# Patient Record
Sex: MALE | Race: WHITE | NOT HISPANIC OR LATINO | URBAN - NONMETROPOLITAN AREA
[De-identification: names, ages, dates, MRNs, and addresses within clinical notes are randomized per-mention and may not be internally consistent; named-entity substitution may affect disease eponyms.]

---

## 2022-09-13 ENCOUNTER — APPOINTMENT (OUTPATIENT)
Dept: URBAN - NONMETROPOLITAN AREA SURGERY 8 | Age: 42
Setting detail: DERMATOLOGY
End: 2022-09-13

## 2022-09-13 DIAGNOSIS — B35.4 TINEA CORPORIS: ICD-10-CM

## 2022-09-13 DIAGNOSIS — L40.0 PSORIASIS VULGARIS: ICD-10-CM

## 2022-09-13 PROCEDURE — OTHER ADDITIONAL NOTES: OTHER

## 2022-09-13 PROCEDURE — OTHER MIPS QUALITY: OTHER

## 2022-09-13 PROCEDURE — OTHER COUNSELING: OTHER

## 2022-09-13 PROCEDURE — OTHER PRESCRIPTION: OTHER

## 2022-09-13 PROCEDURE — 99204 OFFICE O/P NEW MOD 45 MIN: CPT

## 2022-09-13 PROCEDURE — OTHER KOH PREP: OTHER

## 2022-09-13 RX ORDER — TRIAMCINOLONE ACETONIDE 1 MG/G
OINTMENT TOPICAL
Qty: 454 | Refills: 2 | Status: ERX | COMMUNITY
Start: 2022-09-13

## 2022-09-13 RX ORDER — TERBINAFINE HYDROCHLORIDE 250 MG/1
TABLET ORAL
Qty: 14 | Refills: 0 | Status: ERX | COMMUNITY
Start: 2022-09-13

## 2022-09-13 ASSESSMENT — LOCATION SIMPLE DESCRIPTION DERM
LOCATION SIMPLE: RIGHT PRETIBIAL REGION
LOCATION SIMPLE: ABDOMEN
LOCATION SIMPLE: LEFT PRETIBIAL REGION
LOCATION SIMPLE: LEFT BUTTOCK
LOCATION SIMPLE: CHEST
LOCATION SIMPLE: RIGHT BUTTOCK
LOCATION SIMPLE: LEFT LOWER BACK

## 2022-09-13 ASSESSMENT — LOCATION ZONE DERM
LOCATION ZONE: TRUNK
LOCATION ZONE: LEG

## 2022-09-13 ASSESSMENT — LOCATION DETAILED DESCRIPTION DERM
LOCATION DETAILED: LEFT INFERIOR MEDIAL MIDBACK
LOCATION DETAILED: RIGHT PROXIMAL PRETIBIAL REGION
LOCATION DETAILED: LEFT BUTTOCK
LOCATION DETAILED: LEFT LATERAL ABDOMEN
LOCATION DETAILED: LEFT PROXIMAL PRETIBIAL REGION
LOCATION DETAILED: LEFT MEDIAL INFERIOR CHEST
LOCATION DETAILED: RIGHT BUTTOCK

## 2022-09-13 NOTE — PROCEDURE: ADDITIONAL NOTES
Render Risk Assessment In Note?: no
Additional Notes: History of biopsy proven psoriasis managed with topical steroids until he began to flare. He was started on Humira, took two doses, and then was diagnosed with  NonHodgkin Lymphoma. He has been treated with both chemotherapy and radiation. He was in remission, however he has a follow up appt with a new oncologist on 9/20/2022 to be sure that that remains the case. We have no outside records from oncology. In the meantime, his psoriasis is uncontrolled. BSA 30%. He is treatment naive at this point Since he has been without therapy for extended time now. Start triamcinolone bid x 4 weeks after completing treatment for tinea corporis. Follow up 6 weeks. If no improvement consider nbUVB vs biologics providing he remains in remission.
Detail Level: Simple

## 2022-09-13 NOTE — HPI: RASH (PSORIASIS)
Is This A New Presentation, Or A Follow-Up?: Psoriasis
Additional History: Pt reports being prescribed Humira by [facility]. He states the Humira caused his nonhodgkin’s lymphoma and has not had any treatments since March/April.

## 2022-09-13 NOTE — PROCEDURE: MIPS QUALITY
Quality 431: Preventive Care And Screening: Unhealthy Alcohol Use - Screening: Patient not identified as an unhealthy alcohol user when screened for unhealthy alcohol use using a systematic screening method
Quality 110: Preventive Care And Screening: Influenza Immunization: Influenza Immunization not Administered because Patient Refused.
Quality 226: Preventive Care And Screening: Tobacco Use: Screening And Cessation Intervention: Patient screened for tobacco use, is a smoker AND received Cessation Counseling within the Previous 12 Months
Quality 130: Documentation Of Current Medications In The Medical Record: Current Medications Documented
Quality 402: Tobacco Use And Help With Quitting Among Adolescents: Patient screened for tobacco and is a smoker AND received Cessation Counseling
Detail Level: Generalized

## 2022-09-13 NOTE — PROCEDURE: COUNSELING
Patient Specific Counseling (Will Not Stick From Patient To Patient): Pt has follow up with [facility] oncology dept on 9/20/2022.\\nDiscussed starting NUVB at follow up.\\nPt instructed to start triamcinolone ointment after completing 2 weeks of terbinafine treatment. Patient Specific Counseling (Will Not Stick From Patient To Patient): Pt has follow up with [facility] oncology dept on 9/20/2022.\\nDiscussed starting NUVB at follow up.\\nPt instructed to start triamcinolone ointment after completing 2 weeks of terbinafine treatment.

## 2023-01-10 ENCOUNTER — APPOINTMENT (OUTPATIENT)
Dept: URBAN - NONMETROPOLITAN AREA SURGERY 8 | Age: 43
Setting detail: DERMATOLOGY
End: 2023-01-10

## 2023-01-10 DIAGNOSIS — L40.0 PSORIASIS VULGARIS: ICD-10-CM

## 2023-01-10 DIAGNOSIS — B35.4 TINEA CORPORIS: ICD-10-CM

## 2023-01-10 DIAGNOSIS — L82.1 OTHER SEBORRHEIC KERATOSIS: ICD-10-CM

## 2023-01-10 PROCEDURE — OTHER ADDITIONAL NOTES: OTHER

## 2023-01-10 PROCEDURE — OTHER REASSURANCE: OTHER

## 2023-01-10 PROCEDURE — 99213 OFFICE O/P EST LOW 20 MIN: CPT

## 2023-01-10 PROCEDURE — OTHER COUNSELING: OTHER

## 2023-01-10 PROCEDURE — OTHER PRESCRIPTION MEDICATION MANAGEMENT: OTHER

## 2023-01-10 PROCEDURE — OTHER MIPS QUALITY: OTHER

## 2023-01-10 ASSESSMENT — LOCATION ZONE DERM
LOCATION ZONE: LEG
LOCATION ZONE: TRUNK
LOCATION ZONE: ARM

## 2023-01-10 ASSESSMENT — PGA PSORIASIS: PGA PSORIASIS 2020: ALMOST CLEAR

## 2023-01-10 ASSESSMENT — LOCATION SIMPLE DESCRIPTION DERM
LOCATION SIMPLE: RIGHT PRETIBIAL REGION
LOCATION SIMPLE: RIGHT LOWER BACK
LOCATION SIMPLE: RIGHT BUTTOCK
LOCATION SIMPLE: LEFT ELBOW

## 2023-01-10 ASSESSMENT — BSA PSORIASIS: % BODY COVERED IN PSORIASIS: 5

## 2023-01-10 ASSESSMENT — LOCATION DETAILED DESCRIPTION DERM
LOCATION DETAILED: LEFT ELBOW
LOCATION DETAILED: RIGHT BUTTOCK
LOCATION DETAILED: RIGHT DISTAL PRETIBIAL REGION
LOCATION DETAILED: RIGHT INFERIOR MEDIAL LOWER BACK

## 2023-01-10 NOTE — PROCEDURE: ADDITIONAL NOTES
Additional Notes: Improved psoriasis with topicals \\nHe has plenty of triamcinolone at home \\nHe notes back pain in the setting of old injury’s and new bone lesion on the spine that he reports is infected\\nHe is following with a spine surgeon\\nHe denies other joint pain\\nHe will continue the triamcinolone bid m-f\\nFollow up 1 yr
Detail Level: Zone
Render Risk Assessment In Note?: no

## 2023-09-07 ENCOUNTER — APPOINTMENT (OUTPATIENT)
Dept: URBAN - NONMETROPOLITAN AREA SURGERY 8 | Age: 43
Setting detail: DERMATOLOGY
End: 2023-09-07

## 2023-09-07 DIAGNOSIS — L40.0 PSORIASIS VULGARIS: ICD-10-CM

## 2023-09-07 DIAGNOSIS — Z72.0 TOBACCO USE: ICD-10-CM

## 2023-09-07 DIAGNOSIS — L73.2 HIDRADENITIS SUPPURATIVA: ICD-10-CM

## 2023-09-07 PROCEDURE — OTHER PRESCRIPTION: OTHER

## 2023-09-07 PROCEDURE — OTHER PRESCRIPTION MEDICATION MANAGEMENT: OTHER

## 2023-09-07 PROCEDURE — OTHER COUNSELING: OTHER

## 2023-09-07 PROCEDURE — OTHER SMOKING CESSATION COUNSELING: OTHER

## 2023-09-07 PROCEDURE — 99214 OFFICE O/P EST MOD 30 MIN: CPT

## 2023-09-07 PROCEDURE — OTHER MIPS QUALITY: OTHER

## 2023-09-07 RX ORDER — CLOBETASOL PROPIONATE 0.5 MG/G
CREAM TOPICAL
Qty: 30 | Refills: 5 | Status: ERX | COMMUNITY
Start: 2023-09-07

## 2023-09-07 RX ORDER — BENZOYL PEROXIDE 100 MG/ML
1 LIQUID TOPICAL
Qty: 148 | Refills: 6 | Status: ERX | COMMUNITY
Start: 2023-09-07

## 2023-09-07 RX ORDER — DOXYCYCLINE HYCLATE 100 MG/1
2 CAPSULE, GELATIN COATED ORAL
Qty: 60 | Refills: 3 | Status: ERX | COMMUNITY
Start: 2023-09-07

## 2023-09-07 RX ORDER — RESORCINOL
POWDER (GRAM) MISCELLANEOUS
Qty: 40 | Refills: 6 | Status: ERX | COMMUNITY
Start: 2023-09-07

## 2023-09-07 ASSESSMENT — LOCATION ZONE DERM: LOCATION ZONE: TRUNK

## 2023-09-07 ASSESSMENT — HURLEY STAGE
IN YOUR EXPERIENCE, AMONG ALL PATIENTS YOU HAVE SEEN WITH THIS CONDITION, HOW SEVERE IS THIS PATIENT'S CONDITION?: HURLEY STAGE III: MULTIPLE INTERCONNECTED SINUS TRACTS AND ABSCESSES THROUGHOUT AN ENTIRE AREA

## 2023-09-07 ASSESSMENT — LOCATION SIMPLE DESCRIPTION DERM
LOCATION SIMPLE: GROIN
LOCATION SIMPLE: ABDOMEN

## 2023-09-07 ASSESSMENT — LOCATION DETAILED DESCRIPTION DERM
LOCATION DETAILED: PERIUMBILICAL SKIN
LOCATION DETAILED: SUPRAPUBIC SKIN

## 2023-09-07 NOTE — PROCEDURE: PRESCRIPTION MEDICATION MANAGEMENT
Render In Strict Bullet Format?: No
Plan: Worsening HS under the abdomen, he has had numerous I&Ds, continues to develop abscesses under the abdomen\\nHe is awaiting back surgery for herniated disks in the lumbar spine\\nComplicated patient given history of lymphoma, that unfortunately has spread to his brain. He is awaiting treatment \\nStart suppressive therapy \\nFollow up in 2 months
Detail Level: Zone
Initiate Treatment: Doxycycline 100mg bid \\nBPO cleanser qd\\nClobetasol cream bid\\nResorcinol compound bid up to 12 weeks
Plan: Improved. Continue triamcinolone prn to active plaques